# Patient Record
Sex: FEMALE | Race: WHITE | HISPANIC OR LATINO | Employment: FULL TIME | URBAN - METROPOLITAN AREA
[De-identification: names, ages, dates, MRNs, and addresses within clinical notes are randomized per-mention and may not be internally consistent; named-entity substitution may affect disease eponyms.]

---

## 2024-08-06 ENCOUNTER — OFFICE VISIT (OUTPATIENT)
Dept: FAMILY MEDICINE CLINIC | Facility: CLINIC | Age: 34
End: 2024-08-06
Payer: COMMERCIAL

## 2024-08-06 VITALS
TEMPERATURE: 97.1 F | HEART RATE: 88 BPM | HEIGHT: 62 IN | OXYGEN SATURATION: 99 % | DIASTOLIC BLOOD PRESSURE: 72 MMHG | BODY MASS INDEX: 38.09 KG/M2 | WEIGHT: 207 LBS | SYSTOLIC BLOOD PRESSURE: 124 MMHG

## 2024-08-06 DIAGNOSIS — E66.01 CLASS 2 SEVERE OBESITY WITH SERIOUS COMORBIDITY AND BODY MASS INDEX (BMI) OF 37.0 TO 37.9 IN ADULT, UNSPECIFIED OBESITY TYPE: ICD-10-CM

## 2024-08-06 DIAGNOSIS — K76.0 HEPATIC STEATOSIS: Primary | ICD-10-CM

## 2024-08-06 PROBLEM — E66.812 CLASS 2 SEVERE OBESITY WITH SERIOUS COMORBIDITY AND BODY MASS INDEX (BMI) OF 37.0 TO 37.9 IN ADULT: Status: ACTIVE | Noted: 2024-08-06

## 2024-08-06 PROCEDURE — 99203 OFFICE O/P NEW LOW 30 MIN: CPT | Performed by: STUDENT IN AN ORGANIZED HEALTH CARE EDUCATION/TRAINING PROGRAM

## 2024-08-06 NOTE — ASSESSMENT & PLAN NOTE
Patient's (Body mass index is 37.86 kg/m².) indicates that they are morbidly/severely obese (BMI > 40 or > 35 with obesity - related health condition) with health conditions that include  hepatic steatosis  . Weight is newly identified. BMI  is above average; BMI management plan is completed. We discussed low calorie, low carb based diet program, portion control, increasing exercise, joining a fitness center or start home based exercise program, and an mary kay-based approach such as [employer] Pal or Lose It.

## 2024-08-06 NOTE — PROGRESS NOTES
"Chief Complaint  Establish Care (Pt has concerns for her liver levels.) and Annual Exam    Subjective        Debbi Barahona presents to [facility]  History of Present Illness  34-year-old female who presents to establish care today.    Her main concern is hepatic steatosis which was diagnosed after elevated liver enzymes.    She went to the ER for abdominal pain nausea and vomiting and was found to have elevated liver enzymes with a CT abdomen and pelvis showing 2.3 cm hyperdense lesion of the left hepatic lobe.  She was also found to have some mild gastroenteritis that were likely explaining her symptoms.    She has a history of imaging in 2022 at which time the lesion was 1 cm.  Other passed away from liver cancer and so this is concerning to her.  She was on OCP at this time but has since stopped it.    She is currently asymptomatic.  She has undergone MRI of the liver which showed diffuse severe hepatic steatosis, 2.1 cm adenoma, gallbladder sludge but no evidence of cholecystitis.    She is currently walking on her treadmill 30 minutes/day which she is just started.  Has not made any major dietary changes at this point.  She plans to follow-up with Dr. Chaves for hepatic steatosis and hepatic adenoma next month.      Objective   Vital Signs:  /72   Pulse 88   Temp 97.1 °F (36.2 °C)   Ht 157.5 cm (62\")   Wt 93.9 kg (207 lb)   SpO2 99%   BMI 37.86 kg/m²   Estimated body mass index is 37.86 kg/m² as calculated from the following:    Height as of this encounter: 157.5 cm (62\").    Weight as of this encounter: 93.9 kg (207 lb).       Class 2 Severe Obesity (BMI >=35 and <=39.9). Obesity-related health conditions include the following:  hepatic steatosis . Obesity is newly identified. BMI is is above average; BMI management plan is completed. We discussed low calorie, low carb based diet program, portion control, increasing exercise, joining a fitness center or start home " based exercise program, and an mary kay-based approach such as [employer] Pal or Lose It.      Physical Exam  Constitutional:       General: She is not in acute distress.  Eyes:      Conjunctiva/sclera: Conjunctivae normal.   Cardiovascular:      Rate and Rhythm: Normal rate and regular rhythm.   Pulmonary:      Effort: Pulmonary effort is normal. No respiratory distress.      Breath sounds: Normal breath sounds.   Abdominal:      Palpations: Abdomen is soft.      Tenderness: There is no abdominal tenderness.   Skin:     General: Skin is warm and dry.   Neurological:      Mental Status: She is alert and oriented to person, place, and time.   Psychiatric:         Mood and Affect: Mood normal.         Behavior: Behavior normal.        Result Review :    The following data was reviewed by: Liana Bland MD on 08/06/2024:  Common labs          4/4/2024    22:03 4/11/2024    09:13   Common Labs   WBC 7.41     6.94       Hemoglobin 13.0     13.3       Hematocrit 37.9     38.6       Platelets 287     304       Hemoglobin A1C  5.5          Details          This result is from an external source.             Data reviewed : see HPI             Assessment and Plan     Diagnoses and all orders for this visit:    1. Hepatic steatosis (Primary)  Assessment & Plan:  Asymptomatic.  MRI of liver showed severe hepatic steatosis with 2.1 cm hepatic adenoma.  No alcohol use.  She has follow-up scheduled with GI-Dr. Chaves next month.  I encouraged her to keep this appointment.  I recommended avoidance of alcohol, weight loss.  See below.      2. Class 2 severe obesity with serious comorbidity and body mass index (BMI) of 37.0 to 37.9 in adult, unspecified obesity type  Assessment & Plan:  Patient's (Body mass index is 37.86 kg/m².) indicates that they are morbidly/severely obese (BMI > 40 or > 35 with obesity - related health condition) with health conditions that include  hepatic steatosis  . Weight is newly identified. BMI  is above  average; BMI management plan is completed. We discussed low calorie, low carb based diet program, portion control, increasing exercise, joining a fitness center or start home based exercise program, and an mary kay-based approach such as [employer] Pal or Lose It.                Follow Up     Return in about 3 months (around 11/6/2024) for Annual physical.  Patient was given instructions and counseling regarding her condition or for health maintenance advice. Please see specific information pulled into the AVS if appropriate.

## 2024-08-06 NOTE — ASSESSMENT & PLAN NOTE
Asymptomatic.  MRI of liver showed severe hepatic steatosis with 2.1 cm hepatic adenoma.  No alcohol use.  She has follow-up scheduled with GI-Dr. Chaves next month.  I encouraged her to keep this appointment.  I recommended avoidance of alcohol, weight loss.  See below.

## 2024-11-13 ENCOUNTER — OFFICE VISIT (OUTPATIENT)
Dept: FAMILY MEDICINE CLINIC | Facility: CLINIC | Age: 34
End: 2024-11-13
Payer: COMMERCIAL

## 2024-11-13 VITALS
TEMPERATURE: 97.7 F | BODY MASS INDEX: 39.01 KG/M2 | DIASTOLIC BLOOD PRESSURE: 62 MMHG | HEART RATE: 66 BPM | SYSTOLIC BLOOD PRESSURE: 128 MMHG | HEIGHT: 62 IN | WEIGHT: 212 LBS | OXYGEN SATURATION: 98 %

## 2024-11-13 DIAGNOSIS — E66.812 CLASS 2 SEVERE OBESITY WITH SERIOUS COMORBIDITY AND BODY MASS INDEX (BMI) OF 37.0 TO 37.9 IN ADULT, UNSPECIFIED OBESITY TYPE: ICD-10-CM

## 2024-11-13 DIAGNOSIS — E66.01 CLASS 2 SEVERE OBESITY WITH SERIOUS COMORBIDITY AND BODY MASS INDEX (BMI) OF 37.0 TO 37.9 IN ADULT, UNSPECIFIED OBESITY TYPE: ICD-10-CM

## 2024-11-13 DIAGNOSIS — Z00.00 ENCOUNTER FOR HEALTH MAINTENANCE EXAMINATION IN ADULT: Primary | ICD-10-CM

## 2024-11-13 PROBLEM — K76.89 FOCAL NODULAR HYPERPLASIA OF LIVER: Status: ACTIVE | Noted: 2024-11-13

## 2024-11-13 PROCEDURE — 99395 PREV VISIT EST AGE 18-39: CPT | Performed by: STUDENT IN AN ORGANIZED HEALTH CARE EDUCATION/TRAINING PROGRAM

## 2024-11-13 NOTE — ASSESSMENT & PLAN NOTE
Asymptomatic.  MRI of liver showed severe hepatic steatosis with 2.1 cm hepatic adenoma.  No alcohol use.  Further workup includes normal alpha 1 antitrypsin, ceruloplasmin, IgG level, celiac disease, hepatitis panel, AFP.  I recommended avoidance of alcohol, weight loss.  See below.

## 2024-11-13 NOTE — ASSESSMENT & PLAN NOTE
Colonoscopy: Average risk, start screening at age 45  Cervical Cancer Screening: Last 4/2024  Mammogram: Average risk, start screening at age 40  LDCT: never smoker  DEXA: indicated at age 65    Immunizations: eligible for flu, COVID  Labs: Reviewed today  The ASCVD Risk score (Jorge ORTIZ, et al., 2019) failed to calculate for the following reasons:    The 2019 ASCVD risk score is only valid for ages 40 to 79      Patient was counseled in regards to maintaining a healthy lifestyle, rich in whole grains, fruits and vegetables. Limit high saturated fats and processed sugars. Maintain an active lifestyle to promote overall health and well being.

## 2024-11-13 NOTE — PROGRESS NOTES
"Chief Complaint  Annual Exam (Pt has no concerns.)    Subjective        Debbi Barahona presents to [facility]  History of Present Illness  34-year-old female who presents for annual exam today.    She went to the ER for abdominal pain nausea and vomiting and was found to have elevated liver enzymes with a CT abdomen and pelvis showing 2.3 cm hyperdense lesion of the left hepatic lobe.  She was also found to have some mild gastroenteritis that were likely explaining her symptoms.She has a history of imaging in 2022 at which time the lesion was 1 cm.  Other passed away from liver cancer and so this is concerning to her.  She was on OCP at this time but has since stopped it.    Since last appointment has seen Dr. Chaves for liver lesion.  Liver enzymes remain elevated with AST 50 ALT 78 total bili 1.3.  Lab work otherwise normal including negative ceruloplasmin, celiac testing, AFP, IgG.    MRI showed enlarging liver with mildly T2 hyperintense lesion which is stable at 2 cm consistent with FNH.  Small amount of biliary sludge.        Objective   Vital Signs:  /62   Pulse 66   Temp 97.7 °F (36.5 °C)   Ht 157.5 cm (62\")   Wt 96.2 kg (212 lb)   SpO2 98%   BMI 38.78 kg/m²   Estimated body mass index is 38.78 kg/m² as calculated from the following:    Height as of this encounter: 157.5 cm (62\").    Weight as of this encounter: 96.2 kg (212 lb).            Physical Exam  Constitutional:       General: She is not in acute distress.  Eyes:      Conjunctiva/sclera: Conjunctivae normal.   Cardiovascular:      Rate and Rhythm: Normal rate and regular rhythm.   Pulmonary:      Effort: Pulmonary effort is normal. No respiratory distress.   Abdominal:      Palpations: Abdomen is soft.      Tenderness: There is no abdominal tenderness.   Neurological:      Mental Status: She is alert and oriented to person, place, and time.   Psychiatric:         Mood and Affect: Mood normal.         " Behavior: Behavior normal.        Result Review :  The following data was reviewed by: Liana Bland MD on 11/13/2024:  Common labs          4/4/2024    22:03 4/11/2024    09:13 9/11/2024    10:05   Common Labs   Albumin   4.5       Total Bilirubin   1.3       Alkaline Phosphatase   109       AST (SGOT)   50       ALT (SGPT)   78       WBC 7.41     6.94     6.35       Hemoglobin 13.0     13.3     13.4       Hematocrit 37.9     38.6     40.7       Platelets 287     304     272       Hemoglobin A1C  5.5           Details          This result is from an external source.             Data reviewed : None           Assessment and Plan   Diagnoses and all orders for this visit:    1. Encounter for health maintenance examination in adult (Primary)  Assessment & Plan:  Colonoscopy: Average risk, start screening at age 45  Cervical Cancer Screening: Last 4/2024  Mammogram: Average risk, start screening at age 40  LDCT: never smoker  DEXA: indicated at age 65    Immunizations: eligible for flu, COVID  Labs: Reviewed today  The ASCVD Risk score (Jorge DK, et al., 2019) failed to calculate for the following reasons:    The 2019 ASCVD risk score is only valid for ages 40 to 79      Patient was counseled in regards to maintaining a healthy lifestyle, rich in whole grains, fruits and vegetables. Limit high saturated fats and processed sugars. Maintain an active lifestyle to promote overall health and well being.         2. Class 2 severe obesity with serious comorbidity and body mass index (BMI) of 37.0 to 37.9 in adult, unspecified obesity type    We did discuss lifestyle modification with regards to weight loss including basal metabolic rate and 500-calorie reduction from basal metabolic rate to lose weight.  We discussed calorie tracking with an mary kay like lose it or my fitness pal.  We briefly discussed pharmacologic options as well as bariatric surgery.         Follow Up   No follow-ups on file.  Patient was given  instructions and counseling regarding her condition or for health maintenance advice. Please see specific information pulled into the AVS if appropriate.

## 2025-03-26 ENCOUNTER — OFFICE VISIT (OUTPATIENT)
Dept: FAMILY MEDICINE CLINIC | Facility: CLINIC | Age: 35
End: 2025-03-26
Payer: COMMERCIAL

## 2025-03-26 VITALS
TEMPERATURE: 98.2 F | SYSTOLIC BLOOD PRESSURE: 138 MMHG | OXYGEN SATURATION: 98 % | HEIGHT: 62 IN | WEIGHT: 212 LBS | DIASTOLIC BLOOD PRESSURE: 84 MMHG | BODY MASS INDEX: 39.01 KG/M2 | HEART RATE: 68 BPM

## 2025-03-26 DIAGNOSIS — J30.9 ALLERGIC RHINITIS, UNSPECIFIED SEASONALITY, UNSPECIFIED TRIGGER: ICD-10-CM

## 2025-03-26 DIAGNOSIS — J02.8 BACTERIAL PHARYNGITIS: Primary | ICD-10-CM

## 2025-03-26 DIAGNOSIS — B96.89 BACTERIAL PHARYNGITIS: Primary | ICD-10-CM

## 2025-03-26 PROCEDURE — 99214 OFFICE O/P EST MOD 30 MIN: CPT | Performed by: STUDENT IN AN ORGANIZED HEALTH CARE EDUCATION/TRAINING PROGRAM

## 2025-03-26 RX ORDER — DOXYCYCLINE 100 MG/1
100 CAPSULE ORAL 2 TIMES DAILY
Qty: 14 CAPSULE | Refills: 0 | Status: SHIPPED | OUTPATIENT
Start: 2025-03-26 | End: 2025-04-02

## 2025-03-26 NOTE — PROGRESS NOTES
"Chief Complaint  Nasal Congestion (Pt reports congestion symptoms onset 3 weeks ago. ) and Cough    Subjective        Debbi Barahona presents to [facility]  History of Present Illness       The patient presents for evaluation of ear pain, scratchy throat, and chest tightness.    She sought medical attention at an urgent care facility for an ear infection, where she was prescribed antibiotics. Despite completing the full course of amoxicillin, her symptoms have not improved. She continues to experience ear pain. She reports no recent exposure to sick individuals or contact with others exhibiting similar symptoms. She has been medicating with NyQuil.    She also reports a scratchy throat.. She is not aware of any history of asthma. She reports no fevers. She has been taking antihistamine allergy medication since her last appointment, but has not noticed any improvement. She also reports congestion and head pressure.    MEDICATIONS  Current: NyQuil       Objective   Vital Signs:  /84   Pulse 68   Temp 98.2 °F (36.8 °C)   Ht 157.5 cm (62\")   Wt 96.2 kg (212 lb)   SpO2 98%   BMI 38.78 kg/m²   Estimated body mass index is 38.78 kg/m² as calculated from the following:    Height as of this encounter: 157.5 cm (62\").    Weight as of this encounter: 96.2 kg (212 lb).            Physical Exam  Constitutional:       General: She is not in acute distress.  HENT:      Head: Normocephalic and atraumatic.      Nose: Congestion and rhinorrhea present.      Mouth/Throat:      Mouth: Mucous membranes are moist.      Pharynx: Oropharyngeal exudate and posterior oropharyngeal erythema present.   Eyes:      General: No scleral icterus.     Conjunctiva/sclera: Conjunctivae normal.   Cardiovascular:      Rate and Rhythm: Normal rate and regular rhythm.   Pulmonary:      Effort: Pulmonary effort is normal. No respiratory distress.   Skin:     General: Skin is warm and dry.   Neurological:      Mental " Status: She is alert and oriented to person, place, and time.   Psychiatric:         Mood and Affect: Mood normal.         Behavior: Behavior normal.        Result Review :  The following data was reviewed by: Liana Bland MD on 03/26/2025:  Common labs          9/11/2024    10:05   Common Labs   Albumin 4.5       Total Bilirubin 1.3       Alkaline Phosphatase 109       AST (SGOT) 50       ALT (SGPT) 78       WBC 6.35       Hemoglobin 13.4       Hematocrit 40.7       Platelets 272          Details          This result is from an external source.             Data reviewed : see HPI           Assessment and Plan   Diagnoses and all orders for this visit:    1. Bacterial pharyngitis (Primary)  -     doxycycline (VIBRAMYCIN) 100 MG capsule; Take 1 capsule by mouth 2 (Two) Times a Day for 7 days.  Dispense: 14 capsule; Refill: 0    2. Allergic rhinitis, unspecified seasonality, unspecified trigger           1. Pharyngitis.  The patient's condition initially improved with antibiotics but subsequently worsened. The presence of exudates on exam suggests bacterial pharyngitis. A prescription for doxycycline, to be taken twice daily for 7 days. She is advised to continue her current allergy medication regimen for the next month, after which she may attempt discontinuation to assess her response. If there is no improvement in her condition within a week, she should inform the clinic so that a steroid treatment can be initiated.    2. Allergic rhinitis.  Continue antihistamine such as cetirizine or fexofenadine. She is advised to continue this medication for the next month. Additionally, Flonase (fluticasone) nasal spray will be added to her treatment plan to reduce inflammation and alleviate symptoms such as a scratchy throat and ear pressure. She should use the nasal spray twice daily, in the morning and at night.            Follow Up   No follow-ups on file.  Patient was given instructions and counseling regarding her  condition or for health maintenance advice. Please see specific information pulled into the AVS if appropriate.     Patient or patient representative verbalized consent for the use of Ambient Listening during the visit with  Liana Bland MD for chart documentation. 4/6/2025  13:35 EDT